# Patient Record
(demographics unavailable — no encounter records)

---

## 2017-11-16 NOTE — NUR
MSE COMPLETED, MOM CONSENTED TO MEDS, MEDS ADMIN. PT WAS THEN D/C'D HOME, 
ACI/RX X1/SCHOOL NOTED GIVEN TO MOM. PT AMBULATED W/O DIFF/TOOK ALL BELONGS.

## 2018-05-17 NOTE — NUR
MSE COMPLETED, FIBERGLASS RT THUMB SPICA PLACED, COPY OF XRAYS/ACI GIVEN TO 
MOM. PT AMBULATED W/O DIFF/TOOK ALL BELONGINGS.